# Patient Record
Sex: FEMALE | Race: OTHER | Employment: OTHER | URBAN - METROPOLITAN AREA
[De-identification: names, ages, dates, MRNs, and addresses within clinical notes are randomized per-mention and may not be internally consistent; named-entity substitution may affect disease eponyms.]

---

## 2022-07-25 ENCOUNTER — HOSPITAL ENCOUNTER (EMERGENCY)
Age: 72
Discharge: HOME | End: 2022-07-26
Attending: EMERGENCY MEDICINE
Payer: MEDICARE

## 2022-07-25 ENCOUNTER — APPOINTMENT (OUTPATIENT)
Dept: CT IMAGING | Age: 72
End: 2022-07-25
Payer: MEDICARE

## 2022-07-25 ENCOUNTER — APPOINTMENT (OUTPATIENT)
Dept: GENERAL RADIOLOGY | Age: 72
End: 2022-07-25
Payer: MEDICARE

## 2022-07-25 DIAGNOSIS — S16.1XXA ACUTE CERVICAL MYOFASCIAL STRAIN, INITIAL ENCOUNTER: ICD-10-CM

## 2022-07-25 DIAGNOSIS — S00.83XA CONTUSION OF FACE, INITIAL ENCOUNTER: ICD-10-CM

## 2022-07-25 DIAGNOSIS — S80.01XA CONTUSION OF RIGHT KNEE, INITIAL ENCOUNTER: ICD-10-CM
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
## 2022-07-25 DIAGNOSIS — S46.911A RIGHT SHOULDER STRAIN, INITIAL ENCOUNTER: ICD-10-CM

## 2022-07-25 DIAGNOSIS — S09.90XA CLOSED HEAD INJURY, INITIAL ENCOUNTER: ICD-10-CM

## 2022-07-25 PROCEDURE — 70486 CT MAXILLOFACIAL W/O DYE: CPT

## 2022-07-25 PROCEDURE — 73700 CT LOWER EXTREMITY W/O DYE: CPT

## 2022-07-25 PROCEDURE — 70450 CT HEAD/BRAIN W/O DYE: CPT

## 2022-07-25 PROCEDURE — 72125 CT NECK SPINE W/O DYE: CPT

## 2022-07-25 PROCEDURE — 6370000000 HC RX 637 (ALT 250 FOR IP): Performed by: EMERGENCY MEDICINE

## 2022-07-25 PROCEDURE — 73562 X-RAY EXAM OF KNEE 3: CPT

## 2022-07-25 PROCEDURE — 73030 X-RAY EXAM OF SHOULDER: CPT

## 2022-07-25 PROCEDURE — 73502 X-RAY EXAM HIP UNI 2-3 VIEWS: CPT

## 2022-07-25 PROCEDURE — 99284 EMERGENCY DEPT VISIT MOD MDM: CPT

## 2022-07-25 RX ORDER — ACETAMINOPHEN 325 MG/1
650 TABLET ORAL
Status: COMPLETED | OUTPATIENT
Start: 2022-07-25 | End: 2022-07-25

## 2022-07-25 RX ADMIN — ACETAMINOPHEN 650 MG: 325 TABLET ORAL at 20:53

## 2022-07-25 ASSESSMENT — PAIN DESCRIPTION - LOCATION: LOCATION: HEAD;HIP;KNEE

## 2022-07-25 ASSESSMENT — PAIN SCALES - GENERAL
PAINLEVEL_OUTOF10: 10
PAINLEVEL_OUTOF10: 10
PAINLEVEL_OUTOF10: 5

## 2022-07-25 ASSESSMENT — ENCOUNTER SYMPTOMS
BACK PAIN: 0
SHORTNESS OF BREATH: 0
COUGH: 0
ABDOMINAL PAIN: 0

## 2022-07-25 ASSESSMENT — PAIN DESCRIPTION - ORIENTATION: ORIENTATION: RIGHT

## 2022-07-25 ASSESSMENT — PAIN - FUNCTIONAL ASSESSMENT: PAIN_FUNCTIONAL_ASSESSMENT: 0-10

## 2022-07-25 NOTE — ED TRIAGE NOTES
Pt states she was walking outside and tripped and fell onto right side on sidewalk, has right black eye, right shoulder and hip pain, swelling to right knee and knee pain, laceration to left hand. Denies taking blood thinners, hit head on sidewalk. Unable to put weight on right hip.

## 2022-07-26 VITALS
OXYGEN SATURATION: 100 % | HEART RATE: 81 BPM | BODY MASS INDEX: 25.76 KG/M2 | SYSTOLIC BLOOD PRESSURE: 122 MMHG | RESPIRATION RATE: 17 BRPM | WEIGHT: 140 LBS | HEIGHT: 62 IN | TEMPERATURE: 98 F | DIASTOLIC BLOOD PRESSURE: 97 MMHG

## 2022-07-26 RX ORDER — HYDROCODONE BITARTRATE AND ACETAMINOPHEN 5; 325 MG/1; MG/1
1 TABLET ORAL EVERY 8 HOURS PRN
Qty: 10 TABLET | Refills: 0 | Status: SHIPPED | OUTPATIENT
Start: 2022-07-26 | End: 2022-07-29

## 2022-07-26 ASSESSMENT — PAIN SCALES - GENERAL: PAINLEVEL_OUTOF10: 3

## 2022-07-26 ASSESSMENT — PAIN - FUNCTIONAL ASSESSMENT: PAIN_FUNCTIONAL_ASSESSMENT: 0-10

## 2022-07-26 NOTE — DISCHARGE INSTRUCTIONS
Tylenol, ibuprofen or pain medication as needed. Do not take together. Rest.  Cool compresses. Use walker for limited weightbearing for the next few days. Call primary care doctor and orthopedist to recheck. Recheck sooner for worse or worrisome symptoms.

## 2022-07-26 NOTE — ED PROVIDER NOTES
[facility] Emergency Department Provider Note                   PCP:                Jaden López MD               Age: 74 y.o. Sex: female     No diagnosis found. DISPOSITION          MDM  Number of Diagnoses or Management Options  Diagnosis management comments: Fall with head and facial trauma with neck pain. Also pain right upper extremity right lower extremity. Imaging of affected areas. Amount and/or Complexity of Data Reviewed  Tests in the radiology section of CPT®: ordered and reviewed    Risk of Complications, Morbidity, and/or Mortality  Presenting problems: moderate  Diagnostic procedures: low  Management options: low    Patient Progress  Patient progress: stable       Orders Placed This Encounter   Procedures    CT HEAD WO CONTRAST    CT CERVICAL SPINE WO CONTRAST    XR KNEE RIGHT (3 VIEWS)    XR HIP 2-3 VW W PELVIS RIGHT    CT MAXILLOFACIAL WO CONTRAST    XR SHOULDER RIGHT (MIN 2 VIEWS)    CT HIP RIGHT WO CONTRAST        Vicki Mathur is a 74 y.o. female who presents to the Emergency Department with chief complaint of    Chief Complaint   Patient presents with    Fall      58-year-old female. Patient desires daughter to be . Daughter states patient tripped over unequal walk today and fell about 9 AM this morning. Has struck her face. No loss of consciousness. But increasing headache and neck pain. She was just outside her house and managed to limp back to her house. Since that time hip pain is worsened to the point where patient cannot bear weight. She has a history of hypertension. History of hysterectomy due to uterine cancer in the past.  No vomiting no lethargy. No chest pain or abdominal pain. No focal weakness. She did not have syncope. The history is provided by the patient. Fall  The accident occurred 6 to 12 hours ago. The fall occurred while standing. She fell from a height of 1 to 2 ft. She landed on [address]. There was no blood loss.  The point of impact was the head and right shoulder. The pain is present in the right knee and right hip. The pain is moderate. She was Ambulatory at the scene (Incidentally but not now). Associated symptoms include headaches. Pertinent negatives include no fever, no numbness, no abdominal pain and no loss of consciousness. Review of Systems   Constitutional:  Negative for chills and fever. Respiratory:  Negative for cough and shortness of breath. Cardiovascular:  Negative for chest pain and palpitations. Gastrointestinal:  Negative for abdominal pain. Musculoskeletal:  Positive for neck pain. Negative for back pain. Neurological:  Positive for headaches. Negative for dizziness, loss of consciousness, weakness and numbness. All other systems reviewed and are negative. History reviewed. No pertinent past medical history. Past Surgical History:   Procedure Laterality Date    HYSTERECTOMY (CERVIX STATUS UNKNOWN)          History reviewed. No pertinent family history. Social History     Socioeconomic History    Marital status:      Spouse name: None    Number of children: None    Years of education: None    Highest education level: None   Tobacco Use    Smoking status: Never    Smokeless tobacco: Never   Substance and Sexual Activity    Alcohol use: Never    Drug use: Never         Adhesive tape     Previous Medications    No medications on file        Vitals signs and nursing note reviewed. Patient Vitals for the past 4 hrs:   Temp Pulse Resp BP SpO2   07/25/22 1841 98.8 °F (37.1 °C) 88 18 136/89 97 %          Physical Exam  Vitals and nursing note reviewed. Constitutional:       Appearance: She is not ill-appearing. HENT:      Head: Normocephalic. Comments: Right periorbital contusion with soft tissue swelling. No step-off or crepitus.      Right Ear: External ear normal.      Left Ear: External ear normal.      Mouth/Throat:      Mouth: Mucous membranes are moist.      Pharynx: the knee with and without intravenous contrast is   recommended to exclude a mass. 2. No definite radiographic evidence of acute fracture in the pelvis or right   hip. If there is persistent inability to bear weight, cross-sectional imaging   may be beneficial to evaluate for occult fracture. 3. No evidence of acute fracture or dislocation in the right shoulder. Moderate   to severe degenerative changes as above. VOICE DICTATED BY: Dr. Myesha Porras          XR HIP 2-3 VW W PELVIS RIGHT   Final Result      1. No evidence of acute fracture or dislocation in the right knee. Small knee   joint effusion. There are multifocal calcifications and possible abnormal soft   tissue density dorsal to the knee. This is unlikely to be related to recent   trauma, but an MRI of the knee with and without intravenous contrast is   recommended to exclude a mass. 2. No definite radiographic evidence of acute fracture in the pelvis or right   hip. If there is persistent inability to bear weight, cross-sectional imaging   may be beneficial to evaluate for occult fracture. 3. No evidence of acute fracture or dislocation in the right shoulder. Moderate   to severe degenerative changes as above. VOICE DICTATED BY: Dr. Myesha Porras          XR SHOULDER RIGHT (MIN 2 VIEWS)   Final Result      1. No evidence of acute fracture or dislocation in the right knee. Small knee   joint effusion. There are multifocal calcifications and possible abnormal soft   tissue density dorsal to the knee. This is unlikely to be related to recent   trauma, but an MRI of the knee with and without intravenous contrast is   recommended to exclude a mass. 2. No definite radiographic evidence of acute fracture in the pelvis or right   hip. If there is persistent inability to bear weight, cross-sectional imaging   may be beneficial to evaluate for occult fracture.       3. No evidence of acute fracture or dislocation in the right shoulder. Moderate   to severe degenerative changes as above. VOICE DICTATED BY: Dr. Rosa Cotter   Final Result      1. Small right periorbital scalp hematoma. 2. No underlying acute intracranial abnormality. d      CT CERVICAL SPINE WO CONTRAST   Final Result      No evidence of acute cervical spine fracture. VOICE DICTATED BY: Dr. Yazan Shay   Final Result      1. Small scalp hematoma lateral to the right orbit. No post septal or intraconal   extension. 2. No evidence of acute facial fracture. VOICE DICTATED BY: Dr. Arabella Oleary    (Results Pending)                  XR SHOULDER RIGHT (MIN 2 VIEWS)    Result Date: 7/25/2022  EXAMINATION: XR KNEE RIGHT (3 VIEWS), XR SHOULDER RIGHT (MIN 2 VIEWS), XR HIP 2-3 VW W PELVIS RIGHT 7/25/2022 7:22 PM ACCESSION NUMBER: [id number], [id number], [id number] INDICATION: Fall right shoulder and hip pain right knee pain left hand laceration unable to bear weight on right hip COMPARISON: None available TECHNIQUE AND FINDINGS: 3 VIEWS OF THE RIGHT KNEE: Medial and lateral compartment chondrocalcinosis. 3 compartment osteophytes. Small joint effusion. Multifocal soft tissue calcifications dorsal to the knee, with irregular soft tissue density dorsal to the knee. No evidence of acute fracture or dislocation. No radiopaque foreign body. AP VIEW OF THE PELVIS AND AP AND FROG-LEG LATERAL VIEWS OF THE RIGHT HIP: Lower lumbar spine spondylosis. Large femoral head osteophytes and acetabular osteophytes bilaterally. Pubic symphysis degenerative change. Surgical clips overlie the pelvis. No definite radiographic evidence of acute fracture in the right hip or pelvis. Chronic appearing deformity of the left inferior pubic ramus. 3 VIEWS OF THE RIGHT SHOULDER: Chondrocalcinosis. Glenohumeral and acromioclavicular joint space narrowing.  The included portions of the right lung are clear. No evidence of acute fracture or dislocation. No radiopaque foreign body. 1. No evidence of acute fracture or dislocation in the right knee. Small knee joint effusion. There are multifocal calcifications and possible abnormal soft tissue density dorsal to the knee. This is unlikely to be related to recent trauma, but an MRI of the knee with and without intravenous contrast is recommended to exclude a mass. 2. No definite radiographic evidence of acute fracture in the pelvis or right hip. If there is persistent inability to bear weight, cross-sectional imaging may be beneficial to evaluate for occult fracture. 3. No evidence of acute fracture or dislocation in the right shoulder. Moderate to severe degenerative changes as above. VOICE DICTATED BY: Dr. Nam Leal (3 VIEWS)    Result Date: 7/25/2022  EXAMINATION: XR KNEE RIGHT (3 VIEWS), XR SHOULDER RIGHT (MIN 2 VIEWS), XR HIP 2-3 VW W PELVIS RIGHT 7/25/2022 7:22 PM ACCESSION NUMBER: [id number], [id number], [id number] INDICATION: Fall right shoulder and hip pain right knee pain left hand laceration unable to bear weight on right hip COMPARISON: None available TECHNIQUE AND FINDINGS: 3 VIEWS OF THE RIGHT KNEE: Medial and lateral compartment chondrocalcinosis. 3 compartment osteophytes. Small joint effusion. Multifocal soft tissue calcifications dorsal to the knee, with irregular soft tissue density dorsal to the knee. No evidence of acute fracture or dislocation. No radiopaque foreign body. AP VIEW OF THE PELVIS AND AP AND FROG-LEG LATERAL VIEWS OF THE RIGHT HIP: Lower lumbar spine spondylosis. Large femoral head osteophytes and acetabular osteophytes bilaterally. Pubic symphysis degenerative change. Surgical clips overlie the pelvis. No definite radiographic evidence of acute fracture in the right hip or pelvis. Chronic appearing deformity of the left inferior pubic ramus.  3 VIEWS OF THE RIGHT SHOULDER: Chondrocalcinosis. Glenohumeral and acromioclavicular joint space narrowing. The included portions of the right lung are clear. No evidence of acute fracture or dislocation. No radiopaque foreign body. 1. No evidence of acute fracture or dislocation in the right knee. Small knee joint effusion. There are multifocal calcifications and possible abnormal soft tissue density dorsal to the knee. This is unlikely to be related to recent trauma, but an MRI of the knee with and without intravenous contrast is recommended to exclude a mass. 2. No definite radiographic evidence of acute fracture in the pelvis or right hip. If there is persistent inability to bear weight, cross-sectional imaging may be beneficial to evaluate for occult fracture. 3. No evidence of acute fracture or dislocation in the right shoulder. Moderate to severe degenerative changes as above. VOICE DICTATED BY: Dr. Demetrius Calderon    Result Date: 7/25/2022  EXAMINATION: CT HEAD WO CONTRAST 7/25/2022 7:01 PM ACCESSION NUMBER: [id number] COMPARISON: Same-day head CT and cervical spine CT INDICATION: Trauma tripping fall black eye right shoulder pain hip pain laceration TECHNIQUE: Multiple-row detector helical CT examination of the head without intravenous contrast. Radiation dose reduction techniques were used for this study. Our CT scanners use one or all of the following: Automated exposure control, adjustment of the mA and/or kV according to patient size, iterative reconstruction. FINDINGS: The ventricles are within normal limits for the mild degree of global brain parenchymal volume loss. There is no midline shift. The basilar cisterns are patent. There is no cerebellar tonsillar ectopia or herniation. Small right periorbital soft tissue hematoma. There is no evidence of acute intracranial hemorrhage or extra-axial collections. There is no CT evidence of acute infarction. No evidence of skull fracture. 1. Small right periorbital scalp hematoma. 2. No underlying acute intracranial abnormality. d    CT CERVICAL SPINE WO CONTRAST    Result Date: 7/25/2022  EXAMINATION: CT CERVICAL SPINE 7/25/2022 7:01 PM ACCESSION NUMBER: [id number] INDICATION: Trauma hip in fall facial abrasion COMPARISON: Same-day head CT and face CT TECHNIQUE: Contiguous CT images of cervical spine were obtained without intravenous contrast. Coronal and sagittal reformations were made. Radiation dose reduction techniques were used for this study:  Our CT scanners use one or all of the following: Automated exposure control, adjustment of the mA and/or kVp according to patient's size, iterative FINDINGS: Mild straightening of the normal cervical lordosis. Multilevel calcified disc osteophyte complex is intervertebral disc space narrowing. Normally aligned posterior elements. No prevertebral soft tissue swelling. Normally aligned. Cervical articulation. No evidence of acute cervical spine fracture. No definite noncontrast CT evidence of high-grade canal stenosis. Clear lung apices. Mild scattered atherosclerosis. No evidence of acute cervical spine fracture. VOICE DICTATED BY: Dr. Flavia Hickey    Result Date: 7/25/2022  EXAMINATION: FACE CT WITHOUT CONTRAST 7/25/2022 7:01 PM ACCESSION NUMBER: [id number] INDICATION: fall facial swelling black eye COMPARISON: Same-day head CT and cervical spine CT TECHNIQUE: Multiple-row detector helical CT examination of the face without intravenous contrast. Multiplanar reconstructions were performed. Radiation dose reduction techniques were used for this study:  Our CT scanners use one or all of the following: Automated exposure control, adjustment of the mA and/or kVp according to patient's size, iterative reconstruction. FINDINGS: Soft tissue swelling lateral to the right orbit. No injury to the right globe.  No post septal or intraconal hemorrhage in the right orbit. No evidence of acute facial fracture. Reactive prominence of the palatine tonsil and tongue base lymphoid tissue. 1. Small scalp hematoma lateral to the right orbit. No post septal or intraconal extension. 2. No evidence of acute facial fracture. VOICE DICTATED BY: Dr. Camille Pena 2-3 VW W PELVIS RIGHT    Result Date: 7/25/2022  EXAMINATION: XR KNEE RIGHT (3 VIEWS), XR SHOULDER RIGHT (MIN 2 VIEWS), XR HIP 2-3 VW W PELVIS RIGHT 7/25/2022 7:22 PM ACCESSION NUMBER: [id number], [id number], [id number] INDICATION: Fall right shoulder and hip pain right knee pain left hand laceration unable to bear weight on right hip COMPARISON: None available TECHNIQUE AND FINDINGS: 3 VIEWS OF THE RIGHT KNEE: Medial and lateral compartment chondrocalcinosis. 3 compartment osteophytes. Small joint effusion. Multifocal soft tissue calcifications dorsal to the knee, with irregular soft tissue density dorsal to the knee. No evidence of acute fracture or dislocation. No radiopaque foreign body. AP VIEW OF THE PELVIS AND AP AND FROG-LEG LATERAL VIEWS OF THE RIGHT HIP: Lower lumbar spine spondylosis. Large femoral head osteophytes and acetabular osteophytes bilaterally. Pubic symphysis degenerative change. Surgical clips overlie the pelvis. No definite radiographic evidence of acute fracture in the right hip or pelvis. Chronic appearing deformity of the left inferior pubic ramus. 3 VIEWS OF THE RIGHT SHOULDER: Chondrocalcinosis. Glenohumeral and acromioclavicular joint space narrowing. The included portions of the right lung are clear. No evidence of acute fracture or dislocation. No radiopaque foreign body. 1. No evidence of acute fracture or dislocation in the right knee. Small knee joint effusion. There are multifocal calcifications and possible abnormal soft tissue density dorsal to the knee.  This is unlikely to be related to recent trauma, but an MRI of the knee with and without intravenous contrast is

## 2022-07-26 NOTE — ED NOTES
I have reviewed discharge instructions with the patient. The patient verbalized understanding. Patient left ED via Discharge Method: wheelchair to Home with son. Opportunity for questions and clarification provided. Patient given 1 scripts. To continue your aftercare when you leave the hospital, you may receive an automated call from our care team to check in on how you are doing. This is a free service and part of our promise to provide the best care and service to meet your aftercare needs.  If you have questions, or wish to unsubscribe from this service please call [phone]. Thank you for Choosing our [facility] Emergency Department.       Mary Gaines RN  07/26/22 9844